# Patient Record
Sex: MALE | Race: WHITE | Employment: UNEMPLOYED | URBAN - METROPOLITAN AREA
[De-identification: names, ages, dates, MRNs, and addresses within clinical notes are randomized per-mention and may not be internally consistent; named-entity substitution may affect disease eponyms.]

---

## 2023-12-06 ENCOUNTER — OFFICE VISIT (OUTPATIENT)
Age: 23
End: 2023-12-06
Payer: COMMERCIAL

## 2023-12-06 VITALS
TEMPERATURE: 97.3 F | DIASTOLIC BLOOD PRESSURE: 78 MMHG | HEIGHT: 69 IN | SYSTOLIC BLOOD PRESSURE: 120 MMHG | HEART RATE: 60 BPM | WEIGHT: 142.38 LBS | BODY MASS INDEX: 21.09 KG/M2 | RESPIRATION RATE: 14 BRPM | OXYGEN SATURATION: 98 %

## 2023-12-06 DIAGNOSIS — Z00.00 WELL ADULT EXAM: Primary | ICD-10-CM

## 2023-12-06 DIAGNOSIS — F84.0 AUTISM: ICD-10-CM

## 2023-12-06 PROCEDURE — 99395 PREV VISIT EST AGE 18-39: CPT | Performed by: FAMILY MEDICINE

## 2023-12-06 SDOH — ECONOMIC STABILITY: FOOD INSECURITY: WITHIN THE PAST 12 MONTHS, THE FOOD YOU BOUGHT JUST DIDN'T LAST AND YOU DIDN'T HAVE MONEY TO GET MORE.: NEVER TRUE

## 2023-12-06 SDOH — ECONOMIC STABILITY: HOUSING INSECURITY
IN THE LAST 12 MONTHS, WAS THERE A TIME WHEN YOU DID NOT HAVE A STEADY PLACE TO SLEEP OR SLEPT IN A SHELTER (INCLUDING NOW)?: NO

## 2023-12-06 SDOH — ECONOMIC STABILITY: FOOD INSECURITY: WITHIN THE PAST 12 MONTHS, YOU WORRIED THAT YOUR FOOD WOULD RUN OUT BEFORE YOU GOT MONEY TO BUY MORE.: NEVER TRUE

## 2023-12-06 SDOH — ECONOMIC STABILITY: INCOME INSECURITY: HOW HARD IS IT FOR YOU TO PAY FOR THE VERY BASICS LIKE FOOD, HOUSING, MEDICAL CARE, AND HEATING?: NOT HARD AT ALL

## 2023-12-06 ASSESSMENT — PATIENT HEALTH QUESTIONNAIRE - PHQ9
SUM OF ALL RESPONSES TO PHQ9 QUESTIONS 1 & 2: 0
SUM OF ALL RESPONSES TO PHQ QUESTIONS 1-9: 0
SUM OF ALL RESPONSES TO PHQ QUESTIONS 1-9: 0
2. FEELING DOWN, DEPRESSED OR HOPELESS: 0
SUM OF ALL RESPONSES TO PHQ QUESTIONS 1-9: 0
SUM OF ALL RESPONSES TO PHQ QUESTIONS 1-9: 0
1. LITTLE INTEREST OR PLEASURE IN DOING THINGS: 0

## 2023-12-06 NOTE — PROGRESS NOTES
[facility]      Date of Visit:  2023  Patient Name: Librado Oates   Patient :  2000     CHIEF COMPLAINT/HPI:     Librado Oates is a 26 y.o. male who presents today for an general visit to be evaluated for the following condition(s):  No chief complaint on file. Patient here for well exam.  He has no new healthcare issues he is now working at [employer] and loves his job. Works over in [employer]. He is on no medications and no supplements    REVIEW OF SYSTEM      Review of Systems  Patient has no other healthcare issues. No fever no sweats no chills. No chest pain nor shortness of breath. No nausea nor vomiting no diarrhea . No  complaints. No edema issues. REVIEWED INFORMATION      No Known Allergies    No current outpatient medications on file. No current facility-administered medications for this visit.         Patient Active Problem List   Diagnosis    Autism    Well adult exam       Past Medical History:   Diagnosis Date    Autism     Pollen allergies     Wears glasses        Past Surgical History:   Procedure Laterality Date    TONSILLECTOMY Bilateral age 7        Social History     Socioeconomic History    Marital status: Single     Spouse name: None    Number of children: None    Years of education: None    Highest education level: None   Tobacco Use    Smoking status: Never    Smokeless tobacco: Never   Substance and Sexual Activity    Alcohol use: No    Drug use: No     Social Determinants of Health     Financial Resource Strain: Low Risk  (2023)    Overall Financial Resource Strain (CARDIA)     Difficulty of Paying Living Expenses: Not hard at all   Food Insecurity: No Food Insecurity (2023)    Hunger Vital Sign     Worried About Running Out of Food in the Last Year: Never true     [address] in the Last Year: Never true   Transportation Needs: Unknown (2023)    PRAPARE - Transportation     Lack of Transportation (Non-Medical): No   Housing Stability:

## 2024-01-05 PROBLEM — Z00.00 WELL ADULT EXAM: Status: RESOLVED | Noted: 2023-12-06 | Resolved: 2024-01-05

## 2025-05-19 ENCOUNTER — OFFICE VISIT (OUTPATIENT)
Age: 25
End: 2025-05-19
Payer: COMMERCIAL

## 2025-05-19 VITALS
HEART RATE: 78 BPM | SYSTOLIC BLOOD PRESSURE: 112 MMHG | WEIGHT: 149 LBS | OXYGEN SATURATION: 97 % | BODY MASS INDEX: 21.33 KG/M2 | DIASTOLIC BLOOD PRESSURE: 80 MMHG | HEIGHT: 70 IN

## 2025-05-19 DIAGNOSIS — F79 INTELLECTUAL DISABILITY: ICD-10-CM

## 2025-05-19 DIAGNOSIS — F84.0 AUTISM: Primary | ICD-10-CM

## 2025-05-19 PROCEDURE — 99214 OFFICE O/P EST MOD 30 MIN: CPT | Performed by: FAMILY MEDICINE

## 2025-05-19 SDOH — ECONOMIC STABILITY: FOOD INSECURITY: WITHIN THE PAST 12 MONTHS, YOU WORRIED THAT YOUR FOOD WOULD RUN OUT BEFORE YOU GOT MONEY TO BUY MORE.: NEVER TRUE

## 2025-05-19 SDOH — ECONOMIC STABILITY: FOOD INSECURITY: WITHIN THE PAST 12 MONTHS, THE FOOD YOU BOUGHT JUST DIDN'T LAST AND YOU DIDN'T HAVE MONEY TO GET MORE.: NEVER TRUE

## 2025-05-19 ASSESSMENT — PATIENT HEALTH QUESTIONNAIRE - PHQ9
2. FEELING DOWN, DEPRESSED OR HOPELESS: NOT AT ALL
1. LITTLE INTEREST OR PLEASURE IN DOING THINGS: NOT AT ALL
SUM OF ALL RESPONSES TO PHQ QUESTIONS 1-9: 0

## 2025-05-19 NOTE — PATIENT INSTRUCTIONS
Rony    Thank you for choosing [facility].  We know you have options when it comes to your healthcare; we appreciate that you chose us. Our goal is to provide exceptional  service and world class care to every patient.  You will be receiving a survey via email or text message asking for your feedback.  Please take a few minutes to share your thoughts about your recent visit. Your comments help us understand what we do well and ways we can improve.  Thank you in advance for your valuable feedback.      Dr. Dieter RUBIO MA

## 2025-05-19 NOTE — PROGRESS NOTES
[facility]     Date of Visit:  2025  Patient Name: Rony Weeks   Patient :  2000     CHIEF COMPLAINT/HPI:     Rony Weeks is a 24 y.o. male who presents today for an general visit to be evaluated for the following condition(s):  Chief Complaint   Patient presents with    Annual Exam     Has a form for Medicaid that needs filled out.      Patient here for completion of documents for Medicaid.  Basically needs general physical examination and confirmation of his intellectual disability.  Patient has had workup with neurology in the past and dense meet the criteria for autism and has intellectual disability as documented by intelligence test.  Patient last year went for driving evaluation and was found unable to obtain a 's license.  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  REVIEW OF SYSTEM      Review of Systems  Patient has no other healthcare issues.  No fever no sweats no chills. No chest pain nor shortness of breath. No nausea nor vomiting no diarrhea . No  complaints.  No edema issues.      REVIEWED INFORMATION      No Known Allergies    No current outpatient medications on file.     No current facility-administered medications for this visit.        Patient Active Problem List   Diagnosis    Autism       Past Medical History:   Diagnosis Date    Autism     Pollen allergies     Wears glasses        Past Surgical History:   Procedure Laterality Date    TONSILLECTOMY Bilateral age 9        Social History     Socioeconomic History    Marital status: Single   Tobacco Use    Smoking status: Never    Smokeless tobacco: Never   Substance and Sexual Activity    Alcohol use: No    Drug use: No     Social Drivers of Health     Financial Resource Strain: Low Risk  (2023)    Overall Financial Resource Strain (CARDIA)     Difficulty of Paying Living Expenses: Not hard at all   Food Insecurity: